# Patient Record
(demographics unavailable — no encounter records)

---

## 2025-03-20 NOTE — HISTORY OF PRESENT ILLNESS
[FreeTextEntry1] : AWV, establish care [de-identified] : A 39-year-old male with a significant past medical history of alcohol and heroin abuse.  Here to establish care Recent Hospitalization: Admitted for severe alcohol withdrawal symptoms and had a seizure during the episode. Current Symptoms: The patient presents today with tongue fasciculations, shakiness, and increased blood pressure (CIWA score at 5). Last Use: Reported drinking 2-3 beers last night. Medications: Currently prescribed thiamine and folic acid and is starting naltrexone 50 mg daily. Support Systems: Actively involved in an AA program and counseling.   During hospital state Pt had an injury on has median nerve left arm. with IV infusion was misplaced as per patient. now under care Doctor Merrill Garcia. MRI/ ordered and scheduled already. referred Physical therapy.   The patient reports being in overall good health with no significant complaints. The patient states more than 5 years without using opioids/ heroin or any other drugs  PMhx: No significant Past medical History Medications: Gabapentin 300mgs.  Allergies: NKDA Pt willing to start meds to decrease etoh craving  Denies fever, cough, chills, body aches, and sobbing.

## 2025-03-20 NOTE — PHYSICAL EXAM
[No Acute Distress] : no acute distress [Well Nourished] : well nourished [Well Developed] : well developed [Well-Appearing] : well-appearing [Normal Sclera/Conjunctiva] : normal sclera/conjunctiva [PERRL] : pupils equal round and reactive to light [EOMI] : extraocular movements intact [Normal Oropharynx] : the oropharynx was normal [No JVD] : no jugular venous distention [No Lymphadenopathy] : no lymphadenopathy [Supple] : supple [Thyroid Normal, No Nodules] : the thyroid was normal and there were no nodules present [No Respiratory Distress] : no respiratory distress  [No Accessory Muscle Use] : no accessory muscle use [Clear to Auscultation] : lungs were clear to auscultation bilaterally [Normal Rate] : normal rate  [Regular Rhythm] : with a regular rhythm [Normal S1, S2] : normal S1 and S2 [No Murmur] : no murmur heard [No Carotid Bruits] : no carotid bruits [No Abdominal Bruit] : a ~M bruit was not heard ~T in the abdomen [No Varicosities] : no varicosities [Pedal Pulses Present] : the pedal pulses are present [No Edema] : there was no peripheral edema [No Palpable Aorta] : no palpable aorta [No Extremity Clubbing/Cyanosis] : no extremity clubbing/cyanosis [Soft] : abdomen soft [Non Tender] : non-tender [Non-distended] : non-distended [No Masses] : no abdominal mass palpated [No HSM] : no HSM [Normal Bowel Sounds] : normal bowel sounds [Normal Posterior Cervical Nodes] : no posterior cervical lymphadenopathy [Normal Anterior Cervical Nodes] : no anterior cervical lymphadenopathy [No CVA Tenderness] : no CVA  tenderness [No Spinal Tenderness] : no spinal tenderness [No Joint Swelling] : no joint swelling [Grossly Normal Strength/Tone] : grossly normal strength/tone [No Rash] : no rash [Coordination Grossly Intact] : coordination grossly intact [No Focal Deficits] : no focal deficits [Normal Gait] : normal gait [Deep Tendon Reflexes (DTR)] : deep tendon reflexes were 2+ and symmetric [Normal Affect] : the affect was normal [Normal Insight/Judgement] : insight and judgment were intact [de-identified] : Tongue fasciculation [de-identified] : Mild tremors bilateral

## 2025-03-20 NOTE — ASSESSMENT
[FreeTextEntry1] : Medications: Alprazolam 0.25 mg: Prescribe short-term (10 days) to manage acute withdrawal symptoms with a clear discussion about the risk of abuse and plan not to prescribe it long-term. Monitor closely for signs of misuse. Naltrexone 50 mg daily: Continue as planned to reduce cravings for alcohol. Continue Thiamine and Folic Acid: Important for neurological protection and overall health. Support and Counseling:  Regular Counseling Sessions: Patient to continue with current counseling schedule to support mental health and sobriety. Alcoholics Anonymous: Recommend continuing active participation in AA meetings for peer support. Monitoring and Follow-Up:  Schedule Follow-Up Appointments: Every week for the first month to monitor response to medications, adherence to treatment plan, and resolution of withdrawal symptoms. CIWA Score Monitoring: CIWA score today 5, no recommends for admission sent to the hospital continue assessment for severity of withdrawal. Education:  Discuss Risks and Health Impact: Ensure patient understands the impacts of substance abuse and the importance of adherence to the treatment plan. Safety Planning: Educate on recognizing signs of relapse or deterioration and the necessary actions to take, including contacting healthcare providers or emergency services.    -Medical Annual wellness visit completed: -General Lab ordered waiting for results discussed with patient -STD panel performed waiting for results discussed -HRA completed and reviewed with patient -Medical, family, surgical history reviewed with patient and updated -List of current providers r/w patient and updated -Vitals, BMI reviewed and discussed along with healthy BMI goals. Dietary counseling x 15 minutes provided -Depression PHQ 9 completed and reviewed -Annual safety assessment reviewed -discussed advanced directives   -Established routine screening and immunization schedule  Time spent 42 minutes including counseling, charting, face-to-face encounter, chart review

## 2025-03-20 NOTE — HEALTH RISK ASSESSMENT
[Time Spent: ___ Minutes] : I spent [unfilled] minutes performing a depression screening for this patient. [Yes] : Yes [4 or more  times a week (4 pts)] : 4 or more  times a week (4 points) [No falls in past year] : Patient reported no falls in the past year [0] : 2) Feeling down, depressed, or hopeless: Not at all (0) [Never] : Never [NO] : No [HIV Test offered] : HIV Test offered [Hepatitis C test offered] : Hepatitis C test offered [With Patient/Caregiver] : , with patient/caregiver [Designated Healthcare Proxy] : Designated healthcare proxy [Name: ___] : Health Care Proxy's Name: [unfilled]  [Relationship: ___] : Relationship: [unfilled] [Aggressive treatment] : aggressive treatment [I will adhere to the patient's wishes.] : I will adhere to the patient's wishes. [Time Spent: ___ minutes] : Time Spent: [unfilled] minutes [Fair] :  ~his/her~ mood as fair [3 or 4 (1 pt)] : 3 or 4  (1 point) [Monthly (2 pts)] : Monthly (2 points) [With Significant Other] : lives with significant other [Unemployed] : unemployed [Single] : single [Sexually Active] : sexually active [Feels Safe at Home] : Feels safe at home [Fully functional (bathing, dressing, toileting, transferring, walking, feeding)] : Fully functional (bathing, dressing, toileting, transferring, walking, feeding) [Fully functional (using the telephone, shopping, preparing meals, housekeeping, doing laundry, using] : Fully functional and needs no help or supervision to perform IADLs (using the telephone, shopping, preparing meals, housekeeping, doing laundry, using transportation, managing medications and managing finances) [Reports normal functional visual acuity (ie: able to read med bottle)] : Reports normal functional visual acuity [Smoke Detector] : smoke detector [Seat Belt] :  uses seat belt [Sunscreen] : uses sunscreen [Audit-CScore] : 7 [Change in mental status noted] : No change in mental status noted [Language] : denies difficulty with language [Behavior] : denies difficulty with behavior [Learning/Retaining New Information] : denies difficulty learning/retaining new information [Handling Complex Tasks] : denies difficulty handling complex tasks [Reasoning] : denies difficulty with reasoning [Spatial Ability and Orientation] : denies difficulty with spatial ability and orientation [Reports changes in hearing] : Reports no changes in hearing [Reports changes in vision] : Reports no changes in vision [Reports changes in dental health] : Reports no changes in dental health [TB Exposure] : is not being exposed to tuberculosis [Caregiver Concerns] : does not have caregiver concerns [AdvancecareDate] : 3/20/25 [FreeTextEntry4] : [phone]  Met with JOAQUIM GONZALEZ, who was willing to discuss advance care planning.  Our advance care planning conversation included a discussion about:  1. The value and importance of advance care planning.  2. Experiences with loved ones who have been seriously ill or have .  3. Exploration of personal, cultural, or spiritual beliefs that might influence medical decisions.  4. Exploration of goals of care in the event of a sudden injury or illness.  5. Identification of a health care agent.  6. Review and update, or completion of, an advance directive.  Start time: ____________                    End time: ____________

## 2025-03-20 NOTE — COUNSELING
[Potential consequences of obesity discussed] : Potential consequences of obesity discussed [Benefits of weight loss discussed] : Benefits of weight loss discussed [Encouraged to increase physical activity] : Encouraged to increase physical activity [Encouraged to use exercise tracking device] : Encouraged to use exercise tracking device [Target Wt Loss Goal ___] : Weight Loss Goals: Target weight loss goal [unfilled] lbs [Weigh Self Weekly] : weigh self weekly [Decrease Portions] : decrease portions [____ min/wk Activity] : [unfilled] min/wk activity [Keep Food Diary] : keep food diary [Good understanding] : Patient has a good understanding of disease, goals and obesity follow-up plan [Fall prevention counseling provided] : Fall prevention counseling provided [Adequate lighting] : Adequate lighting [No throw rugs] : No throw rugs [Use proper foot wear] : Use proper foot wear [Use recommended devices] : Use recommended devices [Behavioral health counseling provided] : Behavioral health counseling provided [Sleep ___ hours/day] : Sleep [unfilled] hours/day [Engage in a relaxing activity] : Engage in a relaxing activity [Plan in advance] : Plan in advance [AUDIT-C Screening administered and reviewed] : AUDIT-C Screening administered and reviewed [Hazards of at-risk alcohol use discussed] : Hazards of at-risk alcohol use discussed [Strategies to reduce or eliminate alcohol use discussed] : Strategies to reduce or eliminate alcohol use discussed [Support options provided] : Support options provided [Participate in Treatment Program] : Participate in treatment program [FreeTextEntry1] : 15 [FreeTextEntry4] : 15 [de-identified] : Annual Wellness Visit Summary: During a 40-minute face-to-face consultation, more than 50% of the time was devoted to counseling, laboratory/test review, and coordination of medical care. The annual wellness aspects covered in the visit include:  Completion and review of the Health Risk Assessment (HRA). Update of medical, family, and surgical history. Review and update of the list of current healthcare providers. Discussion on vitals and BMI, with an emphasis on achieving healthy BMI goals. Dietary counseling provided for 15 minutes. Completion and review of the Depression PHQ-9. Review of annual safety assessments and discussion on advance directives. Provision of smoking cessation counseling. Establishment of routine screening and immunization schedules. Vaccination and Treatment Recommendations:  Administration of pneumococcal vaccines (Pneumovax once after 65 and Prevnar). Annual influenza vaccine. Hepatitis B vaccine series. Zostavax for shingles prevention. Tetanus, diphtheria, and pertussis (Tdap) vaccine. Discussion on routine vaccination and the relevant schedules. Screening Recommendations:  Colorectal cancer screening through colonoscopy every 10 years or flexible sigmoidoscopy every 5 years, along with annual fecal occult testing. Biennial Bone Mineral Density (BMD) screening for osteoporosis. Annual glaucoma screenings and ophthalmological evaluations. Cardiovascular health screening and cholesterol monitoring with related dietary counseling. Once-off screening for Abdominal Aortic Aneurysm (AAA). Nutrition and Lifestyle Counseling:  Emphasis on a low-salt, low-fat diet adherent to the American Diabetes Association (ADA) guidelines; discussion on diabetes management and blood glucose monitoring. Regular physical activity recommendation. Dietary changes including limit intake of sodium to less than 2 grams a day, avoidance of fried foods, red meats, and other high cholesterol foods. Use of canola or olive oil instead of less healthy fats. Advance Care Planning:  Engagement in advance care planning, discussing the importance and personal influences on medical decisions. Identification of a healthcare agent and review or completion of an advance directive. COVID-19 Protocols:  Detailed instructions for symptomatic and asymptomatic patients concerning testing and self-quarantine measures. Specific guidelines for handling cases with direct or unclear exposure to COVID-19, including mandatory reporting to the [facility] registry. This thorough annual review aims to address comprehensive health aspects ensuring preventive care, optimal disease management, and preparedness for potential health issues.

## 2025-06-19 NOTE — ASSESSMENT
[FreeTextEntry1] : I spent >45 minutes of total time in patient care today including review of prior records, obtaining history, performing exam, reviewing imaging, counseling patient on diagnosis and prognosis, discussing risks and benefits of potential treatment options, and counseling patient on upcoming procedure, and coordinating with outside provider.  I independently reviewed and interpreted outside @[facility] 5/15/25 XRAYS OF LEFT FOREARM (2 views - PA AND LATERAL VIEWS): no acute displaced fracture or dislocation. I independently reviewed and interpreted outside MRI of L forearm @[facility] 5/15/25 - unremarkable.  The condition was explained to the patient. Unclear site of compression. Discussed common sites of compression at carpal tunnel > proximally under lacertus / pronator / FDS. Symptoms are not exacerbated with provocation of these sites on exam today. Also may be due to external compression from when patient was obtunded and intubated.  - repeat EDX to evaluate median nerve function. discussed case with PMR Dr Wilner Kramer to evaluate median nerve for nerve recovery and inching study to localize lesion.  - ordered US of LUE to evaluate for median nerve compression.  This is a chronic problem with systemic symptoms with uncertain prognosis. F/u after testing.

## 2025-06-19 NOTE — IMAGING
[de-identified] : LEFT HAND purplish discoloration and cooler compared to contralateral hand. skin intact. no swelling. no TTP. elbow ROM: good extension, flexion. good pronation, supination. wrist ROM: good extension, flexion. good EPL, FPL. fingers good extension, flex to full fist. good finger abduction and adduction. numbness of thumb, IF, MF, radial RF and thenar eminence. SILT to ulnar RF and SF. SILT to dorsal hand. palpable radial pulse, brisk cap refill all digits. APB 4/5 (contralateral 4+/5). also weakness with index finger FDP. no triggering. Tinel's at carpal tunnel => tingling to palm.  symptoms not exacerbated with pressure over median nerve proximally, resisted middle finger FDS, resisted pronation with elbow extended, resisted supination with elbow flexed.  @[facility] 5/15/25 XRAYS OF LEFT FOREARM (2 views - PA AND LATERAL VIEWS): no acute displaced fracture or dislocation. @6/18/25 XRAYS OF LEFT HAND (3 views - PA, OBLIQUE, AND LATERAL VIEWS): no acute displaced fracture or dislocation. djd of thumb CMCJ. djd of DRUJ.

## 2025-06-19 NOTE — HISTORY OF PRESENT ILLNESS
[de-identified] : 6/18/25: 40yo male (RHD) presents for LEFT hand numbness and tingling after a seizure February 2025. Reports that he was down for ~2 hours, girlfriend called EMS, who transported him to hospital. Numbness localized to thumb, index, middle, radial ring fingers. On review of EHR, patient presented with alcohol intoxication / obtundation c/b aspiration pneumonia requiring intubation. UTox showed benzodiazepines, THC, opioids.  Patient f/u with PCP after discharge => prescribed Naltrexone and Alprazolam for withdrawal symptoms, recommended AA. Patient reports that he uses Alprazolam as needed, not daily, last drank last night (~3 beers). Also taking Folic acid and B vitamins. Noticed tremor of LEFT > RIGHT hand since February 2025. Also c/o LEFT hand purplish discoloration and coldness. Saw PMR Dr Merrill Strange => XR of L forearm, MRI of L elbow/forearm, Arterial doppler of [facility]. prescribed Gabapentin.  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 borderline R CTS. EMG shows PSW in the L FCR and Fibrillation potentials at rest in the L APB. Also L C7 radiculopathy. - L median: DML NR at elbow and wrist. DSL NR. - R median: DML 4.0 ms, DSL 3.4 ms. - L ulnar: 60 m/s above to below elbow, 60 m/s below elbow to wrist. - R ulnar: 48.5 m/s above to below elbow, 56.2 m/s below elbow to wrist.  MRI of L forearm @[facility] 5/15/25 - IMPRESSION: 1. No visualized injury to the median nerve. 2. No fracture or acute osseous injury. 3. No muscle tear or muscle edema.  Arterial doppler of [place] @[facility] 5/8/25 - IMPRESSION: 1. No evidence for hemodynamically significant arterial stenosis or occlusion seen. 2. Normal Doppler waveforms are seen throughout.  Hx: Thrombocytopenia. HLD.  PSA with h/o EtOH withdrawal. [FreeTextEntry5] : JOAQUIM archuleta [RHD] 39 year old male is here today for evaluation of constant LEFT hand numbness and tingling after a seizure 3 months ago. saw neurologist +xrays @ [facility] (forearm) and EMG. reports dropping items and constant numbness of thumb, index, middle, and half of ring finger.

## 2025-06-19 NOTE — HISTORY OF PRESENT ILLNESS
[de-identified] : 6/18/25: 38yo male (RHD) presents for LEFT hand numbness and tingling after a seizure February 2025. Reports that he was down for ~2 hours, girlfriend called EMS, who transported him to hospital. Numbness localized to thumb, index, middle, radial ring fingers. On review of EHR, patient presented with alcohol intoxication / obtundation c/b aspiration pneumonia requiring intubation. UTox showed benzodiazepines, THC, opioids.  Patient f/u with PCP after discharge => prescribed Naltrexone and Alprazolam for withdrawal symptoms, recommended AA. Patient reports that he uses Alprazolam as needed, not daily, last drank last night (~3 beers). Also taking Folic acid and B vitamins. Noticed tremor of LEFT > RIGHT hand since February 2025. Also c/o LEFT hand purplish discoloration and coldness. Saw PMR Dr Merrill Strange => XR of L forearm, MRI of L elbow/forearm, Arterial doppler of [facility]. prescribed Gabapentin.  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 borderline R CTS. EMG shows PSW in the L FCR and Fibrillation potentials at rest in the L APB. Also L C7 radiculopathy. - L median: DML NR at elbow and wrist. DSL NR. - R median: DML 4.0 ms, DSL 3.4 ms. - L ulnar: 60 m/s above to below elbow, 60 m/s below elbow to wrist. - R ulnar: 48.5 m/s above to below elbow, 56.2 m/s below elbow to wrist.  MRI of L forearm @[facility] 5/15/25 - IMPRESSION: 1. No visualized injury to the median nerve. 2. No fracture or acute osseous injury. 3. No muscle tear or muscle edema.  Arterial doppler of [place] @[facility] 5/8/25 - IMPRESSION: 1. No evidence for hemodynamically significant arterial stenosis or occlusion seen. 2. Normal Doppler waveforms are seen throughout.  Hx: Thrombocytopenia. HLD.  PSA with h/o EtOH withdrawal. [FreeTextEntry5] : JOAQUIM archuleta [RHD] 39 year old male is here today for evaluation of constant LEFT hand numbness and tingling after a seizure 3 months ago. saw neurologist +xrays @ [facility] (forearm) and EMG. reports dropping items and constant numbness of thumb, index, middle, and half of ring finger.

## 2025-06-19 NOTE — ASSESSMENT
[FreeTextEntry1] : I spent >45 minutes of total time in patient care today including review of prior records, obtaining history, performing exam, reviewing imaging, counseling patient on diagnosis and prognosis, discussing risks and benefits of potential treatment options, and counseling patient on upcoming procedure, and coordinating with outside provider.  I independently reviewed and interpreted outside @[facility] 5/15/25 XRAYS OF LEFT FOREARM (2 views - PA AND LATERAL VIEWS): no acute displaced fracture or dislocation. I independently reviewed and interpreted outside MRI of L forearm @[facility] 5/15/25 - unremarkable.  The condition was explained to the patient. Unclear site of compression. Discussed common sites of compression at carpal tunnel > proximally under lacertus / pronator / FDS. Symptoms are not exacerbated with provocation of these sites on exam today. Also may be due to external compression from when patient was obtunded and intubated.  - repeat EDX to evaluate median nerve function. discussed case with PMR Dr Wilner Kramer to evaluate median nerve for nerve recovery and inching study to localize lesion.  - ordered US of LUE to evaluate for median nerve compression.  This is a chronic problem with systemic symptoms with uncertain prognosis. F/u after testing.

## 2025-06-19 NOTE — IMAGING
[de-identified] : LEFT HAND purplish discoloration and cooler compared to contralateral hand. skin intact. no swelling. no TTP. elbow ROM: good extension, flexion. good pronation, supination. wrist ROM: good extension, flexion. good EPL, FPL. fingers good extension, flex to full fist. good finger abduction and adduction. numbness of thumb, IF, MF, radial RF and thenar eminence. SILT to ulnar RF and SF. SILT to dorsal hand. palpable radial pulse, brisk cap refill all digits. APB 4/5 (contralateral 4+/5). also weakness with index finger FDP. no triggering. Tinel's at carpal tunnel => tingling to palm.  symptoms not exacerbated with pressure over median nerve proximally, resisted middle finger FDS, resisted pronation with elbow extended, resisted supination with elbow flexed.  @[facility] 5/15/25 XRAYS OF LEFT FOREARM (2 views - PA AND LATERAL VIEWS): no acute displaced fracture or dislocation. @6/18/25 XRAYS OF LEFT HAND (3 views - PA, OBLIQUE, AND LATERAL VIEWS): no acute displaced fracture or dislocation. djd of thumb CMCJ. djd of DRUJ.